# Patient Record
Sex: MALE | Race: WHITE | NOT HISPANIC OR LATINO | URBAN - METROPOLITAN AREA
[De-identification: names, ages, dates, MRNs, and addresses within clinical notes are randomized per-mention and may not be internally consistent; named-entity substitution may affect disease eponyms.]

---

## 2019-07-27 ENCOUNTER — OFFICE VISIT (OUTPATIENT)
Dept: URGENT CARE | Age: 34
End: 2019-07-27
Payer: COMMERCIAL

## 2019-07-27 VITALS
RESPIRATION RATE: 16 BRPM | DIASTOLIC BLOOD PRESSURE: 64 MMHG | BODY MASS INDEX: 21.84 KG/M2 | HEIGHT: 74 IN | SYSTOLIC BLOOD PRESSURE: 136 MMHG | WEIGHT: 170.2 LBS | OXYGEN SATURATION: 100 % | TEMPERATURE: 101 F | HEART RATE: 80 BPM

## 2019-07-27 DIAGNOSIS — J20.8 ACUTE BACTERIAL BRONCHITIS: Primary | ICD-10-CM

## 2019-07-27 DIAGNOSIS — B96.89 ACUTE BACTERIAL BRONCHITIS: Primary | ICD-10-CM

## 2019-07-27 PROCEDURE — 99213 OFFICE O/P EST LOW 20 MIN: CPT | Performed by: PHYSICIAN ASSISTANT

## 2019-07-27 RX ORDER — MULTIVITAMIN
1 TABLET ORAL DAILY
COMMUNITY

## 2019-07-27 RX ORDER — AZITHROMYCIN 250 MG/1
TABLET, FILM COATED ORAL
Qty: 6 TABLET | Refills: 0 | Status: SHIPPED | COMMUNITY
Start: 2019-07-27 | End: 2019-07-31

## 2019-07-27 NOTE — PROGRESS NOTES
[facility]        NAME: Merly Burch is a 36 y o  male  : 1985    MRN: [id number]  DATE: 2019  TIME: 7:34 PM    Assessment and Plan   Acute bacterial bronchitis [J20 8, B96 89]  1  Acute bacterial bronchitis  azithromycin (ZITHROMAX) 250 mg tablet         Patient Instructions       Follow up with PCP in 3-5 days  Proceed to  ER if symptoms worsen  Chief Complaint     Chief Complaint   Patient presents with    Cold Like Symptoms     Pt complaining of fever, body aches, b/l earache, dizziness, post nasal drip and sore throat x4-5 days  History of Present Illness       Patient is here for evaluation of cough, chest congestion, fevers, chills, body aches, ear pain, dizziness  Patient's symptoms started about 4-5 days ago  He has been doing over-the-counter medications without significant relief  Review of Systems   Review of Systems   Constitutional: Positive for chills and fever  Negative for activity change  HENT: Positive for congestion, ear pain, postnasal drip and sore throat  Negative for ear discharge, rhinorrhea, sinus pressure, sinus pain, sneezing, trouble swallowing and voice change  Eyes: Negative  Respiratory: Positive for cough  Negative for shortness of breath and wheezing  Cardiovascular: Negative            Current Medications       Current Outpatient Medications:     Multiple Vitamin (MULTIVITAMIN) tablet, Take 1 tablet by mouth daily, Disp: , Rfl:     azithromycin (ZITHROMAX) 250 mg tablet, Take 2 tablets day 1 then 1 tab days 2-5, Disp: 6 tablet, Rfl: 0    Current Allergies     Allergies as of 2019    (No Known Allergies)            The following portions of the patient's history were reviewed and updated as appropriate: allergies, current medications, past family history, past medical history, past social history, past surgical history and problem list      Past Medical History:   Diagnosis Date    Cancer [facility])        Past Surgical History:   Procedure Laterality Date    SURGERY SCROTAL / TESTICULAR         Family History   Problem Relation Age of Onset    No Known Problems Mother     No Known Problems Father          Medications have been verified  Objective   /64 (BP Location: Right arm, Patient Position: Sitting)   Pulse 80   Temp (!) 101 °F (38 3 °C) (Temporal)   Resp 16   Ht 6' 2" (1 88 m)   Wt 77 2 kg (170 lb 3 2 oz)   SpO2 100%   BMI 21 85 kg/m²        Physical Exam     Physical Exam   Constitutional: He is oriented to person, place, and time  He appears well-developed and well-nourished  No distress  HENT:   Head: Normocephalic and atraumatic  Right Ear: External ear normal    Left Ear: External ear normal    Mouth/Throat: No oropharyngeal exudate  Bilateral nasal congestion and mild erythema with node discharge  Bilateral tonsillar erythema with no soft tissue swelling  No exudate  Eyes: Pupils are equal, round, and reactive to light  Conjunctivae and EOM are normal  Right eye exhibits no discharge  Left eye exhibits no discharge  Cardiovascular: Normal rate, regular rhythm and normal heart sounds  No murmur heard  Pulmonary/Chest: Effort normal  No stridor  No respiratory distress  He has no wheezes  He has no rales  Coarse breath sounds bilateral upper airway  Lymphadenopathy:     He has no cervical adenopathy  Neurological: He is alert and oriented to person, place, and time  Skin: Skin is warm and dry  Capillary refill takes less than 2 seconds  No rash noted  He is not diaphoretic  Psychiatric: He has a normal mood and affect  His behavior is normal  Judgment and thought content normal    Nursing note and vitals reviewed

## 2019-09-13 ENCOUNTER — TELEPHONE (OUTPATIENT)
Dept: INTERNAL MEDICINE CLINIC | Facility: CLINIC | Age: 34
End: 2019-09-13

## 2019-09-13 NOTE — TELEPHONE ENCOUNTER
PLEASE REQUEST PATIENT CHART HIS MOM NEEDS HIS IMMUNIZATIONS FOR AN [place] TRIP & WE ARE TRYING TO FIGURE OUT IF HE HAD AN MMR, [id number] [place] [id number]     PLEASE GIVE ME TO CONTACT INFO FOR CHART STORAGE

## 2022-10-10 NOTE — TELEPHONE ENCOUNTER
Addended by: BASIL GUTIÉRREZ on: 10/10/2022 12:01 PM     Modules accepted: Orders     I called chart storage ([facility]) and they have no records or chart for that patient